# Patient Record
Sex: FEMALE | Race: BLACK OR AFRICAN AMERICAN | NOT HISPANIC OR LATINO | URBAN - METROPOLITAN AREA
[De-identification: names, ages, dates, MRNs, and addresses within clinical notes are randomized per-mention and may not be internally consistent; named-entity substitution may affect disease eponyms.]

---

## 2020-07-25 ENCOUNTER — TELEPHONE ENCOUNTER (OUTPATIENT)
Dept: URBAN - METROPOLITAN AREA CLINIC 13 | Facility: CLINIC | Age: 70
End: 2020-07-25

## 2020-07-25 RX ORDER — POLYETHYLENE GLYCOL 3350, SODIUM SULFATE, SODIUM CHLORIDE, POTASSIUM CHLORIDE, ASCORBIC ACID, SODIUM ASCORBATE 7.5-2.691G
TAKE 32 OZ AS DIRECTED 5:00PM THE EVENING BEFORE AND 6HR PRIOR TO PROCEDURE KIT ORAL
Qty: 1 | Refills: 0 | OUTPATIENT
Start: 2015-09-09 | End: 2015-10-27

## 2020-07-26 ENCOUNTER — TELEPHONE ENCOUNTER (OUTPATIENT)
Dept: URBAN - METROPOLITAN AREA CLINIC 13 | Facility: CLINIC | Age: 70
End: 2020-07-26

## 2020-07-26 RX ORDER — POTASSIUM CHLORIDE 1.5 G/1.58G
MIX 1 PACKET AS DIRECTED AND DRINK BY MOUTH TWICE DAILY 30 MINUTES BEFORE MEALS POWDER, FOR SOLUTION ORAL
Qty: 6 | Refills: 0 | Status: ACTIVE | COMMUNITY
Start: 2015-09-09

## 2020-07-26 RX ORDER — AMLODIPINE BESYLATE 5 MG/1
TAKE 1 TABLET DAILY FOR BLOOD PRESSURE TABLET ORAL
Refills: 0 | Status: ACTIVE | COMMUNITY
Start: 2015-09-09

## 2020-07-26 RX ORDER — BENAZEPRIL HCL 20 MG
TAKE 1 TABLET DAILY TABLET ORAL
Refills: 0 | Status: ACTIVE | COMMUNITY
Start: 2015-09-09

## 2025-01-07 ENCOUNTER — OFFICE VISIT (OUTPATIENT)
Dept: URBAN - METROPOLITAN AREA CLINIC 113 | Facility: CLINIC | Age: 75
End: 2025-01-07
Payer: COMMERCIAL

## 2025-01-07 ENCOUNTER — DASHBOARD ENCOUNTERS (OUTPATIENT)
Age: 75
End: 2025-01-07

## 2025-01-07 VITALS
WEIGHT: 160.8 LBS | HEART RATE: 62 BPM | DIASTOLIC BLOOD PRESSURE: 90 MMHG | HEIGHT: 70 IN | TEMPERATURE: 97.5 F | BODY MASS INDEX: 23.02 KG/M2 | SYSTOLIC BLOOD PRESSURE: 154 MMHG

## 2025-01-07 DIAGNOSIS — Z80.0 FAMILY HISTORY OF PANCREATIC CANCER: ICD-10-CM

## 2025-01-07 DIAGNOSIS — K86.2 PANCREATIC CYST: ICD-10-CM

## 2025-01-07 DIAGNOSIS — Z12.11 COLON CANCER SCREENING: ICD-10-CM

## 2025-01-07 PROCEDURE — 99243 OFF/OP CNSLTJ NEW/EST LOW 30: CPT

## 2025-01-07 RX ORDER — AMLODIPINE BESYLATE 5 MG/1
TAKE 1 TABLET DAILY FOR BLOOD PRESSURE TABLET ORAL
Refills: 0 | Status: ACTIVE | COMMUNITY
Start: 2015-09-09

## 2025-01-07 RX ORDER — BENAZEPRIL HCL 20 MG
TAKE 1 TABLET DAILY TABLET ORAL
Refills: 0 | Status: ACTIVE | COMMUNITY
Start: 2015-09-09

## 2025-01-07 RX ORDER — POTASSIUM CHLORIDE 1.5 G/1.58G
MIX 1 PACKET AS DIRECTED AND DRINK BY MOUTH TWICE DAILY 30 MINUTES BEFORE MEALS POWDER, FOR SOLUTION ORAL
Qty: 6 | Refills: 0 | Status: ON HOLD | COMMUNITY
Start: 2015-09-09

## 2025-01-07 NOTE — HPI-TODAY'S VISIT:
74-year-old female is referred by Dr. Rick pancreas mass.  A copy of today's visit will be forwarded to the referring provider.  MRI lumbar spine without contrast 11/12/2024:Pancreas tail cystic lesion.  MRI abdomen with and without contrast recommended to further characterize and exclude neoplasia.  Spine degenerative change with areas of mild spinal canal narrowing and moderate neural foramen narrowing as above.  MRI of the abdomen with without contrast 12/12/2024:Approximately 1 cm cyst which could potentially reflect a left renal cyst or pancreatic tail cyst as above.  If this cyst is pancreatic in origin is favored to reflect either a pseudocyst from prior pancreatitis or sidebranch IPMN.  Consider consult for MRI follow-up in 12 months if clinically indicated.  Bilateral renal cyst.  Labs 12/6/2024:Normal H/H, normal platelet count, normal amylase, normal , normal CT EEA, normal CA 19-9, creatinine 1.03, GFR 57, normal LFTs, hemoglobin A1c 5.5, normal lipase, total cholesterol 208, , normal sed rate, normal thyroid panel.  She is known to Dr. Thornton.  [facility] colonoscopy performed in 2004 for colon cancer screening left upper quadrant pain was normal. CTAP and US performed at that time for LUQ pain was also unremarkable. Most recent screening colonoscopy in October 2015 was performed without difficulty and again revealed normal exam.  Repeat screening recommended in 10 years.  She did have a back injury in 2023. This is what has led to her lumbar MRI. She was running back and forth to the ICU back in September while her son was ill. 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 She was stressed and this led to a decrease in appetite and 10 lb weight loss. She has since gained the weight back. Denies nausea, vomiting, or abdominal pain. She has an intermittent LUQ pressure/bloating. Bowel movements are regular and without blood or melena. Denies fevers or chills. She has a maternal aunt with pancreatica cancer. She passed away in her 80s.  She denies tobacco use. She rarely consumes ETOH. No prior history of pancreatitis.